# Patient Record
Sex: MALE | Race: WHITE | Employment: OTHER | URBAN - NONMETROPOLITAN AREA
[De-identification: names, ages, dates, MRNs, and addresses within clinical notes are randomized per-mention and may not be internally consistent; named-entity substitution may affect disease eponyms.]

---

## 2019-03-28 ENCOUNTER — APPOINTMENT (OUTPATIENT)
Dept: CT IMAGING | Age: 59
End: 2019-03-28
Attending: INTERNAL MEDICINE
Payer: COMMERCIAL

## 2019-03-28 ENCOUNTER — HOSPITAL ENCOUNTER (OUTPATIENT)
Age: 59
Setting detail: OBSERVATION
Discharge: HOME | End: 2019-03-29
Attending: INTERNAL MEDICINE | Admitting: INTERNAL MEDICINE
Payer: COMMERCIAL

## 2019-03-28 PROBLEM — R07.9 CHEST PAIN: Status: ACTIVE | Noted: 2019-03-28

## 2019-03-28 LAB
AVERAGE GLUCOSE: 153 MG/DL (ref 70–126)
GLUCOSE BLD-MCNC: 107 MG/DL (ref 70–108)
GLUCOSE BLD-MCNC: 139 MG/DL (ref 70–108)
GLUCOSE BLD-MCNC: 162 MG/DL (ref 70–108)
HBA1C MFR BLD: 7.1 % (ref 4.4–6.4)
LV EF: 50 %
LVEF MODALITY: NORMAL
TROPONIN T: < 0.01 NG/ML
TROPONIN T: < 0.01 NG/ML

## 2019-03-28 PROCEDURE — 84484 ASSAY OF TROPONIN QUANT: CPT

## 2019-03-28 PROCEDURE — 6360000002 HC RX W HCPCS: Performed by: INTERNAL MEDICINE

## 2019-03-28 PROCEDURE — G0379 DIRECT REFER HOSPITAL OBSERV: HCPCS

## 2019-03-28 PROCEDURE — 93306 TTE W/DOPPLER COMPLETE: CPT

## 2019-03-28 PROCEDURE — 99219 PR INITIAL OBSERVATION CARE/DAY 50 MINUTES: CPT | Performed by: NUCLEAR MEDICINE

## 2019-03-28 PROCEDURE — 36415 COLL VENOUS BLD VENIPUNCTURE: CPT

## 2019-03-28 PROCEDURE — 83036 HEMOGLOBIN GLYCOSYLATED A1C: CPT

## 2019-03-28 PROCEDURE — 6370000000 HC RX 637 (ALT 250 FOR IP): Performed by: INTERNAL MEDICINE

## 2019-03-28 PROCEDURE — 99220 PR INITIAL OBSERVATION CARE/DAY 70 MINUTES: CPT | Performed by: INTERNAL MEDICINE

## 2019-03-28 PROCEDURE — 6360000004 HC RX CONTRAST MEDICATION: Performed by: INTERNAL MEDICINE

## 2019-03-28 PROCEDURE — 82948 REAGENT STRIP/BLOOD GLUCOSE: CPT

## 2019-03-28 PROCEDURE — 96372 THER/PROPH/DIAG INJ SC/IM: CPT

## 2019-03-28 PROCEDURE — G0378 HOSPITAL OBSERVATION PER HR: HCPCS

## 2019-03-28 PROCEDURE — 71275 CT ANGIOGRAPHY CHEST: CPT

## 2019-03-28 PROCEDURE — 2580000003 HC RX 258: Performed by: INTERNAL MEDICINE

## 2019-03-28 RX ORDER — GLIMEPIRIDE 2 MG/1
2 TABLET ORAL
COMMUNITY

## 2019-03-28 RX ORDER — SPIRONOLACTONE 25 MG/1
12.5 TABLET ORAL DAILY
Status: ON HOLD | COMMUNITY
End: 2019-03-29 | Stop reason: HOSPADM

## 2019-03-28 RX ORDER — ISOSORBIDE MONONITRATE 60 MG/1
60 TABLET, EXTENDED RELEASE ORAL DAILY
COMMUNITY

## 2019-03-28 RX ORDER — SODIUM CHLORIDE 0.9 % (FLUSH) 0.9 %
10 SYRINGE (ML) INJECTION PRN
Status: DISCONTINUED | OUTPATIENT
Start: 2019-03-28 | End: 2019-03-29 | Stop reason: HOSPADM

## 2019-03-28 RX ORDER — CLOPIDOGREL BISULFATE 75 MG/1
75 TABLET ORAL DAILY
Status: DISCONTINUED | OUTPATIENT
Start: 2019-03-28 | End: 2019-03-29 | Stop reason: HOSPADM

## 2019-03-28 RX ORDER — ATORVASTATIN CALCIUM 40 MG/1
40 TABLET, FILM COATED ORAL DAILY
COMMUNITY

## 2019-03-28 RX ORDER — SPIRONOLACTONE 25 MG/1
12.5 TABLET ORAL DAILY
Status: DISCONTINUED | OUTPATIENT
Start: 2019-03-28 | End: 2019-03-29 | Stop reason: HOSPADM

## 2019-03-28 RX ORDER — GLIMEPIRIDE 2 MG/1
2 TABLET ORAL
Status: DISCONTINUED | OUTPATIENT
Start: 2019-03-29 | End: 2019-03-29 | Stop reason: HOSPADM

## 2019-03-28 RX ORDER — DIMENHYDRINATE 50 MG
TABLET ORAL
COMMUNITY

## 2019-03-28 RX ORDER — SODIUM CHLORIDE 0.9 % (FLUSH) 0.9 %
10 SYRINGE (ML) INJECTION EVERY 12 HOURS SCHEDULED
Status: DISCONTINUED | OUTPATIENT
Start: 2019-03-28 | End: 2019-03-29 | Stop reason: HOSPADM

## 2019-03-28 RX ORDER — CLOPIDOGREL BISULFATE 75 MG/1
75 TABLET ORAL DAILY
COMMUNITY

## 2019-03-28 RX ORDER — DEXTROSE MONOHYDRATE 50 MG/ML
100 INJECTION, SOLUTION INTRAVENOUS PRN
Status: DISCONTINUED | OUTPATIENT
Start: 2019-03-28 | End: 2019-03-29 | Stop reason: HOSPADM

## 2019-03-28 RX ORDER — OMEGA-3 FATTY ACIDS CAP DELAYED RELEASE 1000 MG 1000 MG
2000 CAPSULE DELAYED RELEASE ORAL 2 TIMES DAILY
COMMUNITY

## 2019-03-28 RX ORDER — LISINOPRIL 20 MG/1
20 TABLET ORAL DAILY
Status: ON HOLD | COMMUNITY
End: 2019-03-29 | Stop reason: HOSPADM

## 2019-03-28 RX ORDER — LANOLIN ALCOHOL/MO/W.PET/CERES
500 CREAM (GRAM) TOPICAL NIGHTLY
COMMUNITY

## 2019-03-28 RX ORDER — ISOSORBIDE MONONITRATE 60 MG/1
60 TABLET, EXTENDED RELEASE ORAL NIGHTLY
Status: DISCONTINUED | OUTPATIENT
Start: 2019-03-28 | End: 2019-03-29 | Stop reason: HOSPADM

## 2019-03-28 RX ORDER — ONDANSETRON 2 MG/ML
4 INJECTION INTRAMUSCULAR; INTRAVENOUS EVERY 6 HOURS PRN
Status: DISCONTINUED | OUTPATIENT
Start: 2019-03-28 | End: 2019-03-29 | Stop reason: HOSPADM

## 2019-03-28 RX ORDER — PANTOPRAZOLE SODIUM 40 MG/1
40 TABLET, DELAYED RELEASE ORAL DAILY
Status: DISCONTINUED | OUTPATIENT
Start: 2019-03-28 | End: 2019-03-29 | Stop reason: HOSPADM

## 2019-03-28 RX ORDER — PANTOPRAZOLE SODIUM 20 MG/1
20 TABLET, DELAYED RELEASE ORAL DAILY
COMMUNITY

## 2019-03-28 RX ORDER — FENOFIBRATE 160 MG/1
160 TABLET ORAL DAILY
COMMUNITY

## 2019-03-28 RX ORDER — NICOTINE POLACRILEX 4 MG
15 LOZENGE BUCCAL PRN
Status: DISCONTINUED | OUTPATIENT
Start: 2019-03-28 | End: 2019-03-29 | Stop reason: HOSPADM

## 2019-03-28 RX ORDER — DEXTROSE MONOHYDRATE 25 G/50ML
12.5 INJECTION, SOLUTION INTRAVENOUS PRN
Status: DISCONTINUED | OUTPATIENT
Start: 2019-03-28 | End: 2019-03-29 | Stop reason: HOSPADM

## 2019-03-28 RX ORDER — LISINOPRIL 20 MG/1
20 TABLET ORAL DAILY
Status: DISCONTINUED | OUTPATIENT
Start: 2019-03-28 | End: 2019-03-29 | Stop reason: HOSPADM

## 2019-03-28 RX ORDER — ASPIRIN 81 MG/1
81 TABLET ORAL DAILY
Status: DISCONTINUED | OUTPATIENT
Start: 2019-03-28 | End: 2019-03-29 | Stop reason: HOSPADM

## 2019-03-28 RX ORDER — ATORVASTATIN CALCIUM 40 MG/1
40 TABLET, FILM COATED ORAL NIGHTLY
Status: DISCONTINUED | OUTPATIENT
Start: 2019-03-28 | End: 2019-03-29 | Stop reason: HOSPADM

## 2019-03-28 RX ADMIN — ENOXAPARIN SODIUM 40 MG: 40 INJECTION SUBCUTANEOUS at 15:05

## 2019-03-28 RX ADMIN — Medication 10 ML: at 11:53

## 2019-03-28 RX ADMIN — LISINOPRIL 20 MG: 20 TABLET ORAL at 11:51

## 2019-03-28 RX ADMIN — METOPROLOL TARTRATE 25 MG: 25 TABLET, FILM COATED ORAL at 11:52

## 2019-03-28 RX ADMIN — SPIRONOLACTONE 12.5 MG: 25 TABLET ORAL at 11:52

## 2019-03-28 RX ADMIN — ATORVASTATIN CALCIUM 40 MG: 40 TABLET, FILM COATED ORAL at 20:39

## 2019-03-28 RX ADMIN — CLOPIDOGREL BISULFATE 75 MG: 75 TABLET, FILM COATED ORAL at 11:49

## 2019-03-28 RX ADMIN — ISOSORBIDE MONONITRATE 60 MG: 60 TABLET, EXTENDED RELEASE ORAL at 20:40

## 2019-03-28 RX ADMIN — ASPIRIN 81 MG: 81 TABLET, COATED ORAL at 11:49

## 2019-03-28 RX ADMIN — PANTOPRAZOLE SODIUM 40 MG: 40 TABLET, DELAYED RELEASE ORAL at 11:52

## 2019-03-28 RX ADMIN — METOPROLOL TARTRATE 25 MG: 25 TABLET, FILM COATED ORAL at 20:41

## 2019-03-28 RX ADMIN — IOPAMIDOL 80 ML: 755 INJECTION, SOLUTION INTRAVENOUS at 16:29

## 2019-03-28 ASSESSMENT — PAIN SCALES - GENERAL
PAINLEVEL_OUTOF10: 0

## 2019-03-28 NOTE — PROGRESS NOTES
Transfer  Report from [address] [place] [address]  Referring Physician [facility]  Patient Condition: Troponin neg. X2,Electrolytes,HGB,BNPCXR WNL. EKG non specific. Nitro helped CP, percocet for back pain. /58, P 68, T 98.2, sat 100% RA.           Potential floor/room:  Vitals B/P              P             R            P02             T            Weight if greater than 500 pounds  Oxygen needs RA  IV Drips none  Fax Face Sheet

## 2019-03-28 NOTE — PLAN OF CARE
Care plan reviewed with patient and friend. Patient and friend verbalize understanding of the plan of care and contribute to goal setting. Problem: Falls - Risk of:  Goal: Will remain free from falls  Description  Will remain free from falls  Outcome: Ongoing  Note:   No falls noted this shift. Continue falling star program. Bed alarm on, bed in low position. Call light and personal belongings in reach. Patient uses call light appropriately. Goal: Absence of physical injury  Description  Absence of physical injury  Outcome: Ongoing  Note:   No falls noted this shift. Continue falling star program. Bed alarm on, bed in low position. Call light and personal belongings in reach. Patient uses call light appropriately. Problem: Daily Care:  Goal: Daily care needs are met  Description  Daily care needs are met  Outcome: Ongoing  Note:   Purposefully hourly rounding        Problem: Pain:  Goal: Patient's pain/discomfort is manageable  Description  Patient's pain/discomfort is manageable  Outcome: Ongoing  Note:   Patient denies pain so far this shift, will continue to monitor.         Problem: Skin Integrity:  Goal: Skin integrity will stabilize  Description  Skin integrity will stabilize  Outcome: Ongoing  Note:   No new signs or symptoms of skin breakdown noted this shift, encouraging patient to turn and reposition self in bed q2h       Problem: Discharge Planning:  Goal: Discharged to appropriate level of care  Description  Discharged to appropriate level of care  Outcome: Ongoing  Note:   Plan to discharge home with support     Problem: Serum Glucose Level - Abnormal:  Goal: Ability to maintain appropriate glucose levels has stabilized  Description  Ability to maintain appropriate glucose levels has stabilized  Outcome: Ongoing  Note:   Chems ACHS, insulin Ss, jardiance oral medication from home

## 2019-03-29 ENCOUNTER — APPOINTMENT (OUTPATIENT)
Dept: NON INVASIVE DIAGNOSTICS | Age: 59
End: 2019-03-29
Attending: INTERNAL MEDICINE
Payer: COMMERCIAL

## 2019-03-29 VITALS
HEART RATE: 83 BPM | RESPIRATION RATE: 16 BRPM | HEIGHT: 70 IN | WEIGHT: 220.3 LBS | BODY MASS INDEX: 31.54 KG/M2 | SYSTOLIC BLOOD PRESSURE: 113 MMHG | DIASTOLIC BLOOD PRESSURE: 54 MMHG | TEMPERATURE: 97.5 F | OXYGEN SATURATION: 95 %

## 2019-03-29 LAB
ANION GAP SERPL CALCULATED.3IONS-SCNC: 16 MEQ/L (ref 8–16)
BUN BLDV-MCNC: 20 MG/DL (ref 7–22)
CALCIUM SERPL-MCNC: 9.4 MG/DL (ref 8.5–10.5)
CHLORIDE BLD-SCNC: 101 MEQ/L (ref 98–111)
CHOLESTEROL, TOTAL: 169 MG/DL (ref 100–199)
CO2: 22 MEQ/L (ref 23–33)
CREAT SERPL-MCNC: 0.9 MG/DL (ref 0.4–1.2)
ERYTHROCYTE [DISTWIDTH] IN BLOOD BY AUTOMATED COUNT: 18.6 % (ref 11.5–14.5)
ERYTHROCYTE [DISTWIDTH] IN BLOOD BY AUTOMATED COUNT: 36 FL (ref 35–45)
GFR SERPL CREATININE-BSD FRML MDRD: 86 ML/MIN/1.73M2
GLUCOSE BLD-MCNC: 142 MG/DL (ref 70–108)
GLUCOSE BLD-MCNC: 162 MG/DL (ref 70–108)
GLUCOSE BLD-MCNC: 163 MG/DL (ref 70–108)
GLUCOSE BLD-MCNC: 217 MG/DL (ref 70–108)
GLUCOSE BLD-MCNC: 234 MG/DL (ref 70–108)
HCT VFR BLD CALC: 39.7 % (ref 42–52)
HDLC SERPL-MCNC: 29 MG/DL
HEMOGLOBIN: 12.6 GM/DL (ref 14–18)
LDL CHOLESTEROL CALCULATED: ABNORMAL MG/DL
LV EF: 67 %
LVEF MODALITY: NORMAL
MCH RBC QN AUTO: 20.3 PG (ref 26–33)
MCHC RBC AUTO-ENTMCNC: 31.7 GM/DL (ref 32.2–35.5)
MCV RBC AUTO: 63.8 FL (ref 80–94)
PLATELET # BLD: 341 THOU/MM3 (ref 130–400)
PMV BLD AUTO: 9.6 FL (ref 9.4–12.4)
POTASSIUM REFLEX MAGNESIUM: 4.6 MEQ/L (ref 3.5–5.2)
RBC # BLD: 6.22 MILL/MM3 (ref 4.7–6.1)
SODIUM BLD-SCNC: 139 MEQ/L (ref 135–145)
TRIGL SERPL-MCNC: 449 MG/DL (ref 0–199)
WBC # BLD: 8.1 THOU/MM3 (ref 4.8–10.8)

## 2019-03-29 PROCEDURE — 2709999900 HC NON-CHARGEABLE SUPPLY

## 2019-03-29 PROCEDURE — 99217 PR OBSERVATION CARE DISCHARGE MANAGEMENT: CPT | Performed by: INTERNAL MEDICINE

## 2019-03-29 PROCEDURE — 80061 LIPID PANEL: CPT

## 2019-03-29 PROCEDURE — 6370000000 HC RX 637 (ALT 250 FOR IP): Performed by: INTERNAL MEDICINE

## 2019-03-29 PROCEDURE — 36415 COLL VENOUS BLD VENIPUNCTURE: CPT

## 2019-03-29 PROCEDURE — G0378 HOSPITAL OBSERVATION PER HR: HCPCS

## 2019-03-29 PROCEDURE — 93017 CV STRESS TEST TRACING ONLY: CPT

## 2019-03-29 PROCEDURE — 78452 HT MUSCLE IMAGE SPECT MULT: CPT

## 2019-03-29 PROCEDURE — A9500 TC99M SESTAMIBI: HCPCS | Performed by: INTERNAL MEDICINE

## 2019-03-29 PROCEDURE — 80048 BASIC METABOLIC PNL TOTAL CA: CPT

## 2019-03-29 PROCEDURE — 3430000000 HC RX DIAGNOSTIC RADIOPHARMACEUTICAL: Performed by: INTERNAL MEDICINE

## 2019-03-29 PROCEDURE — 2580000003 HC RX 258: Performed by: INTERNAL MEDICINE

## 2019-03-29 PROCEDURE — 99232 SBSQ HOSP IP/OBS MODERATE 35: CPT | Performed by: NURSE PRACTITIONER

## 2019-03-29 PROCEDURE — 85027 COMPLETE CBC AUTOMATED: CPT

## 2019-03-29 PROCEDURE — 82948 REAGENT STRIP/BLOOD GLUCOSE: CPT

## 2019-03-29 RX ADMIN — CLOPIDOGREL BISULFATE 75 MG: 75 TABLET, FILM COATED ORAL at 10:18

## 2019-03-29 RX ADMIN — ASPIRIN 81 MG: 81 TABLET, COATED ORAL at 10:18

## 2019-03-29 RX ADMIN — PANTOPRAZOLE SODIUM 40 MG: 40 TABLET, DELAYED RELEASE ORAL at 10:17

## 2019-03-29 RX ADMIN — Medication 10 ML: at 10:16

## 2019-03-29 RX ADMIN — Medication 34 MILLICURIE: at 08:50

## 2019-03-29 RX ADMIN — GLIMEPIRIDE 2 MG: 2 TABLET ORAL at 10:17

## 2019-03-29 RX ADMIN — Medication 10.1 MILLICURIE: at 07:55

## 2019-03-29 NOTE — PROGRESS NOTES
Discharge teaching and instructions for diagnosis/procedure of chest pain completed with patient using teachback method. AVS reviewed. Printed prescriptions given to patient. Patient voiced understanding regarding prescriptions, follow up appointments, and care of self at home. Discharged ambulatory to  home with support per family.

## 2019-03-29 NOTE — CONSULTS
[address] [place] [address]                                  CONSULTATION    PATIENT NAME: Jose De Los Santos                      :        1960  MED REC NO:   [id number]                           ROOM:       0022  ACCOUNT NO:   [de-identified]                           ADMIT DATE: 2019  PROVIDER:     ANA CRISTINA Killianyadi Chaconie DATE:  2019    CARDIOLOGY CONSULTATION    REASON FOR CONSULTATION:  Chest pain. REQUESTING PROVIDER:  Hospitalist Services. HISTORY OF PRESENT ILLNESS:  A 79-year-old gentleman with a history of  coronary artery disease, previous bypass surgery; hypertension;  hyperlipidemia; family history of coronary artery disease and diabetes. The patient comes in with symptoms of chest discomfort. No specific  triggers. No specific radiation. His symptoms occurred mainly at rest,  described as a heartburn, pretty much resolves spontaneously after that. The patient had a cardiac cath at [facility] about three  years ago and he reports that his bypasses are open, has not required  any stenting. Initial assessment with EKG and cardiac enzymes showed no  acute findings. We were consulted to assist in the management of the  patient. REVIEW OF SYSTEMS:  No fever, no chills, no weight loss. No hematuria  or dysuria. No abdominal pain, nausea, vomiting, or diarrhea. No  obvious psych problems or suicidal ideation. No skin rashes. No  obvious dizziness, lightheadedness, or loss of consciousness. No recent  trauma. No bleeding problems. No HEENT-related problems. PAST MEDICAL HISTORY:  1. Coronary artery disease, bypass surgery. 2.  Hypertension. 3.  Diabetes. 4.  Hyperlipidemia. 5.  Acid reflux. ALLERGIES:  No known drug allergies. CURRENT MEDICATIONS:  Plavix 75 mg a day, Zetia, Amaryl 2 mg a day,  insulin, Imdur 30 a day, Pravachol 20 a day, Protonix 40 a day.     SOCIAL HISTORY:  No tobacco.  No drugs. No alcohol. Previous history  of smoking. FAMILY HISTORY:  Significant for coronary artery disease. PHYSICAL EXAMINATION:  VITAL SIGNS:  Showed blood pressure of 130/80, heart rate of 70,  respiratory rate 16. GENERAL APPEARANCE:  A pleasant gentleman in no acute distress. EYES AND EARS:  No discharge. NECK:  No JVD. No bruits. No masses. LUNGS:   Decreased air entry. No crackles. No wheezes. HEART:  Normal S1 and S2. Systolic murmur grade 2/6. ABDOMEN:  Soft, nontender. Positive bowel sounds. No organomegaly. EXTREMITIES:  No significant edema. NEUROLOGIC:  Remains grossly intact. Awake and alert. No focal  deficits. PSYCHIATRIC:  No evidence of active psychosis. SKIN:  No rashes. LABORATORY DATA:  Labs pending. IMPRESSION:  This is a gentleman transferred [facility]  because of symptoms of chest pain, higher risk for coronary artery  disease. PLAN:  As follows:  1. The patient is being scheduled for a CT scan to rule out aortic  dissection by the admitting physician. 2.  If the patient rules out for that, he will be considered for  ischemia workup. Either a stress test or a cardiac cath was discussed  with him at length and as well as an echo. Further management would  follow depending on the above findings. Thank you for allowing me to participate in the care of this patient.         Karen Rojo M.D.    D: 03/28/2019 18:42:49       T: 03/28/2019 21:13:13     [email or web address]  Job#: [id number]     Doc#: [id number]    CC:

## 2019-03-29 NOTE — H&P
History & Physical        Patient:  Jac Lopes  YOB: 1960    MRN: [id number]     Acct: [de-identified]    PCP: FLAKO Pizarro CNP    Date of Admission: 3/28/2019    Date of Service: Pt seen/examined on 03/28/19  and Admitted to Observation with expected LOS less than two midnights due to medical therapy. Chief Complaint:  Chest pain      History Of Present Illness:     60 y.o. male who presented to [facility] with chest pain. He has a history of 2 vessel cabg, PCI, HTN, B-thalassemia, T2DM. He presents with one day of retrosternal chest pain that radiates to his shoulder blades. Awoke him from his sleep. Sharp stabbing with pressure like sensation. Took tums with only mild relief of stabbing but pressure remained. Didn't improve until nitro was given in ED at OSH. Having some of the discomfort today, however, less intense. Past Medical History:          Diagnosis Date    CAD (coronary artery disease)     Cerebral artery occlusion with [facility] infarction ([facility]     2012    Diabetes mellitus ([facility]     Disease of blood and blood forming organ     Beta Thalsemia - minor     History of blood transfusion     CABG 2012    Hyperlipidemia     Hypertension     Pneumonia     2012       Past Surgical History:          Procedure Laterality Date    CARDIAC SURGERY      CABG 2012       Medications Prior to Admission:      Prior to Admission medications    Medication Sig Start Date End Date Taking?  Authorizing Provider   aspirin 81 MG tablet Take 81 mg by mouth daily   Yes Historical Provider, MD   spironolactone (ALDACTONE) 25 MG tablet Take 12.5 mg by mouth daily   Yes Historical Provider, MD   isosorbide mononitrate (IMDUR) 60 MG extended release tablet Take 60 mg by mouth daily   Yes Historical Provider, MD   atorvastatin (LIPITOR) 40 MG tablet Take 40 mg by mouth daily   Yes Historical Provider, MD   glimepiride (AMARYL) 2 MG tablet Take 2 mg by mouth every morning (before breakfast)   Yes Historical Provider, MD   clopidogrel (PLAVIX) 75 MG tablet Take 75 mg by mouth daily   Yes Historical Provider, MD   empagliflozin (JARDIANCE) 10 MG tablet Take 10 mg by mouth daily   Yes Historical Provider, MD   niacin 500 MG extended release capsule Take 500 mg by mouth nightly   Yes Historical Provider, MD   metoprolol tartrate (LOPRESSOR) 25 MG tablet Take 25 mg by mouth 2 times daily   Yes Historical Provider, MD   fenofibrate 160 MG tablet Take 160 mg by mouth daily   Yes Historical Provider, MD   pantoprazole (PROTONIX) 20 MG tablet Take 20 mg by mouth daily   Yes Historical Provider, MD   Coenzyme Q10 (CO Q-10) 100 MG CAPS Take by mouth   Yes Historical Provider, MD   lisinopril (PRINIVIL;ZESTRIL) 20 MG tablet Take 20 mg by mouth daily   Yes Historical Provider, MD   Omega-3 Fatty Acids (FISH OIL) 1000 MG CPDR Take 2,000 mg by mouth 2 times daily   Yes Historical Provider, MD       Allergies:  Patient has no known allergies. Social History:      The patient currently lives at home    TOBACCO:   reports that he has quit smoking. His smoking use included cigarettes. He has a 25.00 pack-year smoking history. He does not have any smokeless tobacco history on file. ETOH:   has no alcohol history on file. Family History:              Problem Relation Age of Onset   [facility] Stroke Mother     Other Mother     Coronary Art Dis Father     Alcohol Abuse Brother        Diet:  Diet NPO Time Specified  DIET CARB CONTROL; Carb Control: 3 carb choices (45 gms)/meal    REVIEW OF SYSTEMS:   Pertinent positives as noted in the HPI. All other systems reviewed and negative. PHYSICAL EXAM:    BP (!) 121/55   Pulse 81   Temp 99.7 °F (37.6 °C) (Oral)   Resp 16   Ht 5' 10\" (1.778 m)   Wt 224 lb 14.4 oz (102 kg)   SpO2 92%   BMI 32.27 kg/m²     General appearance:  No apparent distress, appears stated age and cooperative. HEENT:  Normal cephalic, atraumatic without obvious deformity.  Pupils equal, round, and reactive to light. Extra ocular muscles intact. Conjunctivae/corneas clear. Neck: Supple, with full range of motion. No jugular venous distention. Trachea midline. Respiratory:  Normal respiratory effort. Clear to auscultation, bilaterally without Rales/Wheezes/Rhonchi. Cardiovascular:  Regular rate and rhythm Grade 2/6 NOVA  Abdomen: Soft, non-tender, non-distended with normal bowel sounds. Musculoskeletal:  No clubbing, cyanosis or edema bilaterally. Full range of motion without deformity. Skin: Skin color, texture, turgor normal.  No rashes or lesions. Neurologic:  Neurovascularly intact without any focal sensory/motor deficits. Cranial nerves: II-XII intact, grossly non-focal.  Psychiatric:  Alert and oriented, thought content appropriate, normal insight  Capillary Refill: Brisk,< 3 seconds   Peripheral Pulses: +2 palpable, equal bilaterally       Labs:     No results for input(s): WBC, HGB, HCT, PLT in the last 72 hours. No results for input(s): NA, K, CL, CO2, BUN, CREATININE, CALCIUM, PHOS in the last 72 hours. Invalid input(s): MAGNES  No results for input(s): AST, ALT, BILIDIR, BILITOT, ALKPHOS in the last 72 hours. No results for input(s): INR in the last 72 hours. No results for input(s): Asa Mylar in the last 72 hours. Urinalysis:    No results found for: NITRU, WBCUA, BACTERIA, RBCUA, BLOODU, SPECGRAV, GLUCOSEU    Intake & Output:  No intake/output data recorded. I/O this shift: In: 1 [P.O.:475]  Out: -       Radiology:     EKG:  I have reviewed the EKG with the following interpretation: NSR, no ST segment deviations or TWI's    CTA CHEST W WO CONTRAST   Final Result   1. No evidence of thoracic aortic dissection. 2. No CT angiographic evidence of thoracic aortic aneurysm. 3. No acute intrathoracic findings. **This report has been created using voice recognition software.   It may contain minor errors which are inherent in voice recognition

## 2019-03-29 NOTE — PROGRESS NOTES
Cardiology Progress Note      Patient:  Ivana Castillo  YOB: 1960  MRN: [id number]   Acct: [de-identified]  [facility] Date:  3/28/2019  Primary Cardiologist: Dr. Brandin Bridges at [facility], seen by Dr. Tamela Vergara    Per Dr. Wynne Ganser consult note:  Naa Hemphill:  Chest pain.     REQUESTING PROVIDER:  Hospitalist Services.     HISTORY OF PRESENT ILLNESS:  A 58-year-old gentleman with a history of  coronary artery disease, previous bypass surgery; hypertension;  hyperlipidemia; family history of coronary artery disease and diabetes. The patient comes in with symptoms of chest discomfort. No specific  triggers. No specific radiation. His symptoms occurred mainly at rest,  described as a heartburn, pretty much resolves spontaneously after that. The patient had a cardiac cath at [facility] about three  years ago and he reports that his bypasses are open, has not required  any stenting. Initial assessment with EKG and cardiac enzymes showed no  acute findings. We were consulted to assist in the management of the  patient      Subjective (Events in last 24 hours): Stress completed today, results pending. Has had no chest pain or pain between shoulder blades since transfer from [facility]. Denies sob or palpitations. Has had some low normal BP readings today.       Objective:   /62   Pulse 84   Temp 98.1 °F (36.7 °C) (Oral)   Resp 18   Ht 5' 10\" (1.778 m)   Wt 220 lb 4.8 oz (99.9 kg)   SpO2 94%   BMI 31.61 kg/m²        TELEMETRY: SR    Physical Exam:  General Appearance: alert and oriented to person, place and time, in no acute distress  Cardiovascular: normal rate, regular rhythm, normal S1 and S2, no murmurs, rubs, clicks, or gallops, distal pulses intact, no carotid bruits, no JVD  Pulmonary/Chest: clear to auscultation bilaterally- no wheezes, rales or rhonchi, normal air movement, no respiratory distress  Abdomen: soft, non-tender, non-distended, normal bowel sounds, no masses Extremities: visualized.      Pulmonic Valve   The pulmonic valve was not well visualized .   Trivial pulmonic regurgitation visualized.      Left Atrium   Left atrial size was normal.      Left Ventricle   Ejection fraction is visually estimated at 50%.   Overall left ventricular function is normal.      Right Atrium   Right atrial size was normal.      Right Ventricle   Mildly dilated right ventricle.      Pericardial Effusion   The pericardium was normal in appearance with no evidence of a pericardial   effusion.      Pleural Effusion   No evidence of pleural effusion.      Aorta / Great Vessels   -Aortic root dimension within normal limits.   -The Pulmonary artery is within normal limits.   -IVC size is within normal limits with normal respiratory phasic changes.     Summary   Ejection fraction is visually estimated at 50%.   Overall left ventricular function is normal.   Mildly dilated right ventricle.   The aortic valve leaflets were not well visualized.   Aortic valve appears tricuspid.   Aortic valve leaflets are somewhat thickened.   Aortic valve leaflets are Mildly calcified.   Mild aortic stenosis is present.   Trivial aortic regurgitation is noted.      Signature      ----------------------------------------------------------------   Electronically signed by Jennifer Rogers MD       Stress: Completed, results pending        Lab Data:    Cardiac Enzymes:  No results for input(s): CKTOTAL, CKMB, CKMBINDEX, TROPONINI in the last 72 hours.     CBC:   Lab Results   Component Value Date    WBC 8.1 03/29/2019    RBC 6.22 03/29/2019    HGB 12.6 03/29/2019    HCT 39.7 03/29/2019     03/29/2019       CMP:  Lab Results   Component Value Date     03/29/2019    K 4.6 03/29/2019     03/29/2019    CO2 22 03/29/2019    BUN 20 03/29/2019    CREATININE 0.9 03/29/2019    LABGLOM 86 03/29/2019    GLUCOSE 142 03/29/2019    CALCIUM 9.4 03/29/2019       Hepatic Function Panel:  No results found for: ALKPHOS, ALT, AST, PROT, BILITOT, BILIDIR, IBILI, LABALBU    Magnesium:  No results found for: MG    PT/INR:  No results found for: PROTIME, INR    HgBA1c:    Lab Results   Component Value Date    LABA1C 7.1 03/28/2019       FLP:  Lab Results   Component Value Date    TRIG 449 03/29/2019    HDL 29 03/29/2019    LDLCALC SEE BELOW 03/29/2019       TSH:  No results found for: TSH      Assessment:  · Heart burn, chest pain, pain between shoulder blades: resolved with NTG at OSH. Troponin negative,  EKG w/o acute abnormalities   CT chest negative for thoracic aortic dissection or aneurysm or acute  intrathoracic findings  · CAD: H/o PCI, 2V CABG 2012  · EF 50 per echo 3/28/19  · HTN  · HLP  · DM type 2  · CVA 2012      Plan:  · Stress test completed, results pending  · Continue asa, statin, plavix, imdur, ACEi, BB, aldactone  · If stress test negative for ischemia and no further symptoms likely consider discharge from cardiology standpoint, however patient is apprehensive concerning negative stress test in past with positive cath and open heart surgery.           Electronically signed by FLAKO Sosa CNP on 3/29/2019 at 1:01 PM

## 2019-03-29 NOTE — CARE COORDINATION
CASE MANAGEMENT OBSERVATION NOTE       3/29/19, 7:26 AM    Armani Maynard       Admitted from: transfer from [address] [place] [address] 151  3/28/2019/ 1005   Location: [address] [place] [address]- Reason for admit: Chest pain [R07.9]  Chest pain [R07.9]  Chest pain [R07.9]   Admit order signed?: yes    Pertinent Info/Orders/Treatment Plan:  Trops neg. Telemetry monitoring, NSR. Cardiology consulted, stress test and echo ordered. PCP: FLAKO Bergman CNP    Discharge Plan: Neno Ennis is off the unit in testing. Per report, plans home independently.

## 2019-03-31 NOTE — DISCHARGE SUMMARY
Hospital Medicine Discharge Summary      Patient Identification:   Garrett Martinez   : 1960  MRN: [id number]   Account: [de-identified]      Patient's PCP: FLAKO Sloan CNP    Admit Date: 3/28/2019     Discharge Date: 3/29/2019      Admitting Physician: Renetta Quiroga MD     Discharge Physician: Renetta Quiroga MD     Discharge Diagnoses: Active Hospital Problems    Diagnosis Date Noted    Coronary artery disease [I25.10]      Priority: High    Chest pain [R07.9] 2019       The patient was seen and examined on day of discharge and this discharge summary is in conjunction with any daily progress note from day of discharge. Hospital Course:   Garrett Martinez is a 60 y.o. male admitted to [address] [place] [address] on 3/28/2019 for chest pain        60 y.o. male who presented to [address] [place] [address] with chest pain. He has a history of 2 vessel cabg, PCI, HTN, B-thalassemia, T2DM. He presents with one day of retrosternal chest pain that radiates to his shoulder blades. Awoke him from his sleep. Sharp stabbing with pressure like sensation. Took tums with only mild relief of stabbing but pressure remained. Didn't improve until nitro was given in ED at OSH. Having some of the discomfort today, however, less intense. He underwent CTA which was negative for dissection. Stress test was performed and was normal. He was noted to have low Bp's thus was advised to hold his lisinopril and aldactone and follow up with his PCP.        Exam:     Vitals:  Vitals:    19 0305 19 1000 19 1200 19 1559   BP: 114/62 (!) 86/50 100/62 (!) 113/54   Pulse: 81 84  83   Resp: 16 18  16   Temp: 97.8 °F (36.6 °C) 98.1 °F (36.7 °C)  97.5 °F (36.4 °C)   TempSrc: Oral Oral  Oral   SpO2: 94% 94%  95%   Weight: 220 lb 4.8 oz (99.9 kg)      Height:         Weight: Weight: 220 lb 4.8 oz (99.9 kg)     24 hour intake/output:No intake or output data in the 24 hours ending 19 3554      General appearance:  No apparent distress, appears stated age and cooperative. HEENT:  Normal cephalic, atraumatic without obvious deformity. Pupils equal, round, and reactive to light. Extra ocular muscles intact. Conjunctivae/corneas clear. Neck: Supple, with full range of motion. No jugular venous distention. Trachea midline. Respiratory:  Normal respiratory effort. Clear to auscultation, bilaterally without Rales/Wheezes/Rhonchi. Cardiovascular:  Regular rate and rhythm with normal S1/S2 without murmurs, rubs or gallops. Abdomen: Soft, non-tender, non-distended with normal bowel sounds. Musculoskeletal:  No clubbing, cyanosis or edema bilaterally. Full range of motion without deformity. Skin: Skin color, texture, turgor normal.  No rashes or lesions. Neurologic:  Neurovascularly intact without any focal sensory/motor deficits. Cranial nerves: II-XII intact, grossly non-focal.  Psychiatric:  Alert and oriented, thought content appropriate, normal insight  Capillary Refill: Brisk,< 3 seconds   Peripheral Pulses: +2 palpable, equal bilaterally       Labs: For convenience and continuity at follow-up the following most recent labs are provided:      CBC:    Lab Results   Component Value Date    WBC 8.1 03/29/2019    HGB 12.6 03/29/2019    HCT 39.7 03/29/2019     03/29/2019       Renal:    Lab Results   Component Value Date     03/29/2019    K 4.6 03/29/2019     03/29/2019    CO2 22 03/29/2019    BUN 20 03/29/2019    CREATININE 0.9 03/29/2019    CALCIUM 9.4 03/29/2019         Significant Diagnostic Studies    Radiology:   CTA CHEST W WO CONTRAST   Final Result   1. No evidence of thoracic aortic dissection. 2. No CT angiographic evidence of thoracic aortic aneurysm. 3. No acute intrathoracic findings. **This report has been created using voice recognition software. It may contain minor errors which are inherent in voice recognition technology. **      Final report electronically signed by Dr. Keily Alvarez on 3/28/2019 4:53 PM             Consults:     IP CONSULT TO CARDIOLOGY    Disposition: Home  Condition at Discharge: Stable    Code Status:  Prior     Patient Instructions:    Discharge lab work: none  Activity: activity as tolerated  Diet: No diet orders on file      Follow-up visits:   FLAKO Acevedo CNP  [address]  [phone]    On 4/16/2019  12:30    Gricelda Adams, APRN - CNP  [address] [place] [address]  [phone]    On 4/4/2019  10:30         Discharge Medications:      Antkike Wright Medication Instructions [id number]    Printed on:03/31/19 0944   Medication Information                      aspirin 81 MG tablet  Take 81 mg by mouth daily             atorvastatin (LIPITOR) 40 MG tablet  Take 40 mg by mouth daily             clopidogrel (PLAVIX) 75 MG tablet  Take 75 mg by mouth daily             Coenzyme Q10 (CO Q-10) 100 MG CAPS  Take by mouth             empagliflozin (JARDIANCE) 10 MG tablet  Take 10 mg by mouth daily             fenofibrate 160 MG tablet  Take 160 mg by mouth daily             glimepiride (AMARYL) 2 MG tablet  Take 2 mg by mouth every morning (before breakfast)             isosorbide mononitrate (IMDUR) 60 MG extended release tablet  Take 60 mg by mouth daily             metoprolol tartrate (LOPRESSOR) 25 MG tablet  Take 25 mg by mouth 2 times daily             niacin 500 MG extended release capsule  Take 500 mg by mouth nightly             Omega-3 Fatty Acids (FISH OIL) 1000 MG CPDR  Take 2,000 mg by mouth 2 times daily             pantoprazole (PROTONIX) 20 MG tablet  Take 20 mg by mouth daily                 Time Spent on discharge is more than 30 minutes in the examination, evaluation, counseling and review of medications and discharge plan. Signed: Thank you FLAKO Acevedo CNP for the opportunity to be involved in this patient's care.     Electronically signed by Kirt Sweet MD on 3/31/2019 at 9:44 AM

## 2019-04-01 NOTE — CARE COORDINATION
4/1/19, 1:04 PM    Home alone. Discharge plan discussed by  and . Discharge plan reviewed with patient/ family. Patient/ family verbalize understanding of discharge plan and are in agreement with plan. Understanding was demonstrated using the teach back method.